# Patient Record
Sex: MALE | Race: WHITE
[De-identification: names, ages, dates, MRNs, and addresses within clinical notes are randomized per-mention and may not be internally consistent; named-entity substitution may affect disease eponyms.]

---

## 2020-03-10 ENCOUNTER — HOSPITAL ENCOUNTER (EMERGENCY)
Dept: HOSPITAL 33 - ED | Age: 30
Discharge: HOME | End: 2020-03-10
Payer: MEDICAID

## 2020-03-10 VITALS — SYSTOLIC BLOOD PRESSURE: 159 MMHG | OXYGEN SATURATION: 99 % | HEART RATE: 46 BPM | DIASTOLIC BLOOD PRESSURE: 79 MMHG

## 2020-03-10 DIAGNOSIS — K08.9: Primary | ICD-10-CM

## 2020-03-10 DIAGNOSIS — S02.5XXA: ICD-10-CM

## 2020-03-10 PROCEDURE — 99283 EMERGENCY DEPT VISIT LOW MDM: CPT

## 2020-03-10 NOTE — ERPHSYRPT
- History of Present Illness


Time Seen by Provider: 03/10/20 07:10


Source: patient


Exam Limitations: no limitations


Patient Subjective Stated Complaint: Pt c/o of pain in the right side of mouth 

due to a bad tooth on top and bottom, pain for approx 1.5 weeks but has gotten 

worse in the past couple of days, has not contacted a dentist due to no 

insurance and is waiting on his tax check


Triage Nursing Assessment: pt came to ER alone, hypertensive, bradycardic that 

the pt reports is normal, cavities, no other issues at this time


Physician History: 





This is a 29-year-old male who has had over a week of right upper and lower 

dental pain.  Patient does not have insurance and does not have the money to 

see a dentist at this time.  He has been using ibuprofen.  There is not much 

benefit with ibuprofen.


Timing/Duration: gradual onset, weeks (Over a week)


Severity: moderate


ENT Location: dental


Prearrival Treatment: over the counter meds


Modifying Factors: Improves With: activity


Associated Symptoms: jaw pain (Right side)


Allergies/Adverse Reactions: 








Penicillins Allergy (Verified 03/10/20 07:10)


 








- Review of Systems


Constitutional: No Symptoms


Eyes: No Symptoms


Ears, Nose, & Throat: Other (Upper and lower molar dental pain on the right side

)


Respiratory: No Symptoms


Cardiac: No Symptoms


Abdominal/Gastrointestinal: No Symptoms


Genitourinary Symptoms: No Symptoms


Musculoskeletal: No Symptoms


Skin: No Symptoms


Neurological: No Symptoms


Psychological: No Symptoms


Endocrine: No Symptoms


Hematologic/Lymphatic: No Symptoms


Immunological/Allergic: No Symptoms


All Other Systems: Reviewed and Negative





- Past Medical History


Pertinent Past Medical History: No


Neurological History: No Pertinent History


ENT History: No Pertinent History


Cardiac History: No Pertinent History


Respiratory History: No Pertinent History


Endocrine Medical History: No Pertinent History


Musculoskeletal History: No Pertinent History


GI Medical History: No Pertinent History


 History: No Pertinent History


Psycho-Social History: No Pertinent History


Male Reproductive Disorders: No Pertinent History





- Past Surgical History


Past Surgical History: Yes


Neuro Surgical History: No Pertinent History


Cardiac: No Pertinent History


Respiratory: No Pertinent History


Gastrointestinal: Hernia Repair


Genitourinary: No Pertinent History


Musculoskeletal: No Pertinent History


Male Surgical History: No Pertinent History





- Social History


Smoking Status: Current every day smoker


Exposure to second hand smoke: Yes


Drug Use: none


Patient Lives Alone: Yes





- Nursing Vital Signs


Nursing Vital Signs: 





 Initial Vital Signs











Temperature  98.0 F   03/10/20 07:03


 


Pulse Rate  46 L  03/10/20 07:03


 


Blood Pressure  159/79   03/10/20 07:03


 


O2 Sat by Pulse Oximetry  99   03/10/20 07:03








 Pain Scale











Pain Intensity                 7

















- Physical Exam


General Appearance: no apparent distress, alert, anxiety


Eye Exam: bilateral eye: normal inspection, PERRL, EOMI


Ear Exam: bilateral ear: auricle normal


Nasal Exam: normal inspection


Throat Exam: pharynx normal, dental tenderness (Right side back molars with 

multiple fractions.  No abscess present.  Pain present on both upper and lower 

molar regions)


Neck Exam: normal inspection, non-tender, supple, full range of motion, No 

trachea midline, No lymphadenopathy (R), No lymphadenopathy (L)


Cardiovascular/Respiratory Exam: chest non-tender


Abdominal Exam: non-tender


Neurologic Exam: alert, oriented x 3, cooperative, CNs II-XII nml as tested, 

normal mood/affect, nml cerebellar function, nml station & gait


Skin Exam: normal color, warm, dry


**SpO2 Interpretation**: normal


SpO2: 99


O2 Delivery: Room Air





- Course


Nursing assessment & vital signs reviewed: Yes





- Progress


Progress: unchanged


Counseled pt/family regarding: diagnosis, need for follow-up





- Departure


Departure Disposition: Home


Clinical Impression: 


 Dental infection, Fractured tooth





Condition: Stable


Critical Care Time: No


Additional Instructions: 


Follow-up with dentist for definitive care.  Continue ibuprofen 600 mg orally 

with food 3 times a day.


Prescriptions: 


Oxycodone HCl/Acetaminophen [Percocet 5-325 mg Tablet] 1 each PO Q8H PRN PRN #6 

tablet MDD 3


 PRN Reason: Pain


Clindamycin HCl 150 mg*** [Cleocin 150 mg Capsule***] 2 cap PO QID #56 capsule